# Patient Record
Sex: FEMALE | Race: WHITE | URBAN - METROPOLITAN AREA
[De-identification: names, ages, dates, MRNs, and addresses within clinical notes are randomized per-mention and may not be internally consistent; named-entity substitution may affect disease eponyms.]

---

## 2018-02-19 ENCOUNTER — EMERGENCY (EMERGENCY)
Facility: HOSPITAL | Age: 28
LOS: 1 days | Discharge: HOME | End: 2018-02-19
Attending: EMERGENCY MEDICINE | Admitting: EMERGENCY MEDICINE
Payer: MEDICAID

## 2018-02-19 VITALS
SYSTOLIC BLOOD PRESSURE: 103 MMHG | OXYGEN SATURATION: 100 % | RESPIRATION RATE: 18 BRPM | DIASTOLIC BLOOD PRESSURE: 61 MMHG | HEART RATE: 71 BPM

## 2018-02-19 VITALS
TEMPERATURE: 98 F | SYSTOLIC BLOOD PRESSURE: 121 MMHG | RESPIRATION RATE: 16 BRPM | DIASTOLIC BLOOD PRESSURE: 80 MMHG | OXYGEN SATURATION: 100 % | HEART RATE: 100 BPM

## 2018-02-19 DIAGNOSIS — R20.2 PARESTHESIA OF SKIN: ICD-10-CM

## 2018-02-19 LAB
ALBUMIN SERPL ELPH-MCNC: 4.3 G/DL — SIGNIFICANT CHANGE UP (ref 3.4–5)
ALP SERPL-CCNC: 49 U/L — SIGNIFICANT CHANGE UP (ref 40–120)
ALT FLD-CCNC: 15 U/L — SIGNIFICANT CHANGE UP (ref 12–42)
ANION GAP SERPL CALC-SCNC: 12 MMOL/L — SIGNIFICANT CHANGE UP (ref 9–16)
APPEARANCE UR: CLEAR — SIGNIFICANT CHANGE UP
AST SERPL-CCNC: 14 U/L — LOW (ref 15–37)
BASOPHILS NFR BLD AUTO: 0.6 % — SIGNIFICANT CHANGE UP (ref 0–2)
BILIRUB SERPL-MCNC: 0.4 MG/DL — SIGNIFICANT CHANGE UP (ref 0.2–1.2)
BILIRUB UR-MCNC: NEGATIVE — SIGNIFICANT CHANGE UP
BUN SERPL-MCNC: 10 MG/DL — SIGNIFICANT CHANGE UP (ref 7–23)
CALCIUM SERPL-MCNC: 9.3 MG/DL — SIGNIFICANT CHANGE UP (ref 8.5–10.5)
CHLORIDE SERPL-SCNC: 104 MMOL/L — SIGNIFICANT CHANGE UP (ref 96–108)
CK SERPL-CCNC: 78 U/L — SIGNIFICANT CHANGE UP (ref 26–192)
CO2 SERPL-SCNC: 25 MMOL/L — SIGNIFICANT CHANGE UP (ref 22–31)
COLOR SPEC: YELLOW — SIGNIFICANT CHANGE UP
CREAT SERPL-MCNC: 0.87 MG/DL — SIGNIFICANT CHANGE UP (ref 0.5–1.3)
DIFF PNL FLD: (no result)
EOSINOPHIL NFR BLD AUTO: 1.7 % — SIGNIFICANT CHANGE UP (ref 0–6)
GLUCOSE SERPL-MCNC: 85 MG/DL — SIGNIFICANT CHANGE UP (ref 70–99)
GLUCOSE UR QL: NEGATIVE — SIGNIFICANT CHANGE UP
HCG UR QL: NEGATIVE — SIGNIFICANT CHANGE UP
HCT VFR BLD CALC: 41.6 % — SIGNIFICANT CHANGE UP (ref 34.5–45)
HGB BLD-MCNC: 14 G/DL — SIGNIFICANT CHANGE UP (ref 11.5–15.5)
IMM GRANULOCYTES NFR BLD AUTO: 0.2 % — SIGNIFICANT CHANGE UP (ref 0–1.5)
KETONES UR-MCNC: NEGATIVE — SIGNIFICANT CHANGE UP
LEUKOCYTE ESTERASE UR-ACNC: NEGATIVE — SIGNIFICANT CHANGE UP
LYMPHOCYTES # BLD AUTO: 35 % — SIGNIFICANT CHANGE UP (ref 13–44)
MAGNESIUM SERPL-MCNC: 1.9 MG/DL — SIGNIFICANT CHANGE UP (ref 1.6–2.6)
MCHC RBC-ENTMCNC: 29.1 PG — SIGNIFICANT CHANGE UP (ref 27–34)
MCHC RBC-ENTMCNC: 33.7 G/DL — SIGNIFICANT CHANGE UP (ref 32–36)
MCV RBC AUTO: 86.5 FL — SIGNIFICANT CHANGE UP (ref 80–100)
MONOCYTES NFR BLD AUTO: 6 % — SIGNIFICANT CHANGE UP (ref 2–14)
NEUTROPHILS NFR BLD AUTO: 56.5 % — SIGNIFICANT CHANGE UP (ref 43–77)
NITRITE UR-MCNC: NEGATIVE — SIGNIFICANT CHANGE UP
PCP SPEC-MCNC: SIGNIFICANT CHANGE UP
PH UR: 5.5 — SIGNIFICANT CHANGE UP (ref 5–8)
PLATELET # BLD AUTO: 249 K/UL — SIGNIFICANT CHANGE UP (ref 150–400)
POTASSIUM SERPL-MCNC: 3.5 MMOL/L — SIGNIFICANT CHANGE UP (ref 3.5–5.3)
POTASSIUM SERPL-SCNC: 3.5 MMOL/L — SIGNIFICANT CHANGE UP (ref 3.5–5.3)
PROT SERPL-MCNC: 7.3 G/DL — SIGNIFICANT CHANGE UP (ref 6.4–8.2)
PROT UR-MCNC: NEGATIVE MG/DL — SIGNIFICANT CHANGE UP
RBC # BLD: 4.81 M/UL — SIGNIFICANT CHANGE UP (ref 3.8–5.2)
RBC # FLD: 12.4 % — SIGNIFICANT CHANGE UP (ref 10.3–16.9)
SODIUM SERPL-SCNC: 141 MMOL/L — SIGNIFICANT CHANGE UP (ref 132–145)
SP GR SPEC: <=1.005 — SIGNIFICANT CHANGE UP (ref 1–1.03)
TSH SERPL-MCNC: 2.93 UIU/ML — SIGNIFICANT CHANGE UP (ref 0.36–3.74)
UROBILINOGEN FLD QL: 0.2 E.U./DL — SIGNIFICANT CHANGE UP
WBC # BLD: 8.3 K/UL — SIGNIFICANT CHANGE UP (ref 3.8–10.5)
WBC # FLD AUTO: 8.3 K/UL — SIGNIFICANT CHANGE UP (ref 3.8–10.5)

## 2018-02-19 PROCEDURE — 93010 ELECTROCARDIOGRAM REPORT: CPT

## 2018-02-19 PROCEDURE — 70450 CT HEAD/BRAIN W/O DYE: CPT | Mod: 26

## 2018-02-19 PROCEDURE — 99285 EMERGENCY DEPT VISIT HI MDM: CPT | Mod: 25

## 2018-02-19 RX ORDER — SODIUM CHLORIDE 9 MG/ML
1000 INJECTION INTRAMUSCULAR; INTRAVENOUS; SUBCUTANEOUS
Qty: 0 | Refills: 0 | Status: DISCONTINUED | OUTPATIENT
Start: 2018-02-19 | End: 2018-02-23

## 2018-02-19 RX ADMIN — SODIUM CHLORIDE 200 MILLILITER(S): 9 INJECTION INTRAMUSCULAR; INTRAVENOUS; SUBCUTANEOUS at 20:41

## 2018-02-19 NOTE — ED PROVIDER NOTE - PROGRESS NOTE DETAILS
labs and urine reviewed.  CT brain ordered due to persistent symptoms - no acute findings on the scan.  Continues to appear comfortable - sitting up in bed reading a book. labs and urine reviewed.  CT brain ordered due to persistent symptoms - no acute findings on the scan.  Continues to appear comfortable - sitting up in bed reading a book.  Conservative management discussed with the patient in detail.  Close PMD/neurology follow up encouraged.  Strict ED return instructions discussed in detail and patient given the opportunity to ask any questions about their discharge diagnosis and instructions.  No tick bites or hx to suggest lyme disease.  NO pertinent family history of autoimmune disorders.  Although recently had URI do not suspect encephalitis as she has no headache, fever or change in mental status.  Stroke is very unlikely.  Age, no risk factors, no objective findings, and no face involvement.

## 2018-02-19 NOTE — ED PROVIDER NOTE - OBJECTIVE STATEMENT
27y F with PMHx of WPW s/p ablation presents to ED for lightheadedness x 1 day. 27y F with PMHx of tuberculosis (6 months old), WPW s/p ablation 20 years ago, GERD, presents to ED for multiple medical complaints. Pt states she was on her way to work 8 hours ago when she began feeling constant tingling in her left leg, between her calf and her buttock. Over the course of the day, she began to also feel constant tingling in her left upper arm, as well as intermittent lightheadedness, CP, and SOB, worse with deep breaths. Pt had URI symptoms last week, which have since subsided. Pt also notes having a cardiac workup in the last few months, with normal results. Admits to tobacco use and occasional EtOH use. No facial numbness or tingling.    Pt notes having frequent left rib pain 1 week ago. She had woken up that morning with the back of her legs feeling sore (L side > R side), as if she had just exercised. She went to urgent care and had an x-ray and EKG done, with no acute findings. No CT was performed at the time due to her insurance. 27y F with PMHx of tuberculosis (6 months old), WPW s/p ablation 20 years ago, GERD, presents to ED for multiple medical complaints. Pt states she was on her way to work 8 hours ago when she began feeling constant tingling in her left leg, between her calf and her buttock. Over the course of the day, she began to also feel constant tingling in her left upper arm, as well as intermittent lightheadedness, CP, and SOB, worse with deep breaths. Pt had URI symptoms last week, which have since subsided. Pt also notes having a cardiac workup in the last few months, with normal results. Admits to tobacco use and occasional EtOH use. No facial numbness or tingling.  Denies slurred speech or memory deficit.      Pt notes having frequent left rib pain 1 week ago. She had woken up that morning with the back of her legs feeling sore (L side > R side), as if she had just exercised. She went to urgent care and had an x-ray and EKG done, with no acute findings. No CT was performed at the time due to her insurance.

## 2018-02-19 NOTE — ED PROVIDER NOTE - PMH
WPW (Eron-Parkinson-White syndrome) GERD (gastroesophageal reflux disease)    Tuberculosis    WPW (Eron-Parkinson-White syndrome)

## 2018-02-19 NOTE — ED ADULT TRIAGE NOTE - CHIEF COMPLAINT QUOTE
feeling lightheaded and tingling in left arm and leg x 1 day. H/o jessie parkinson white s/p ablation x 20

## 2018-02-19 NOTE — ED PROVIDER NOTE - MEDICAL DECISION MAKING DETAILS
c/o leg>arm numbness since 12 pm, gradual, constant.  No pain or weakness.  No speech changes or facial involvement.  URI ~ 2 weeks.  No sig family history.  Denies PE or DVT risk factors.  No VS derangements.  Non focal neurological exam - grossly normal motor and sensory exam.  Labs, urine, fluids ordered

## 2018-02-19 NOTE — ED ADULT NURSE NOTE - CHPI ED SYMPTOMS NEG
no fever/no nausea/no vomiting/no blurred vision/no loss of consciousness/no change in level of consciousness/no dizziness/no weakness/no confusion

## 2018-02-19 NOTE — ED PROVIDER NOTE - MUSCULOSKELETAL, MLM
Spine appears normal, range of motion is not limited, no muscle or joint tenderness Spine appears normal, range of motion is not limited, no muscle or joint tenderness.  motor 5/5 proximal and distal upper and lower extremities.  No sensory discrepancy on upper and lower extremity exam.

## 2018-02-19 NOTE — ED PROVIDER NOTE - GASTROINTESTINAL NEGATIVE STATEMENT, MLM
no abdominal pain,no diarrhea, no nausea and no vomiting. no abdominal pain, no diarrhea, no nausea and no vomiting.

## 2018-02-19 NOTE — ED ADULT NURSE NOTE - OBJECTIVE STATEMENT
Pt c/o left upper and lower extremity numbness x1 week worsening today with new onset dizziness. Pt denies any CP/SOB, no N/V/D, no fever/chills. Pt denies any recent travel. Pt has hx of ablation x22 years ago.
